# Patient Record
Sex: MALE | Race: WHITE
[De-identification: names, ages, dates, MRNs, and addresses within clinical notes are randomized per-mention and may not be internally consistent; named-entity substitution may affect disease eponyms.]

---

## 2018-02-17 ENCOUNTER — HOSPITAL ENCOUNTER (OUTPATIENT)
Dept: HOSPITAL 47 - RADMRIMAIN | Age: 58
Discharge: HOME | End: 2018-02-17
Attending: OTOLARYNGOLOGY
Payer: COMMERCIAL

## 2018-02-17 DIAGNOSIS — H90.42: Primary | ICD-10-CM

## 2018-02-17 PROCEDURE — 70553 MRI BRAIN STEM W/O & W/DYE: CPT

## 2018-02-17 NOTE — MR
EXAMINATION TYPE: MR brain and iac wo/w con

 

DATE OF EXAM: 2/17/2018

 

COMPARISON: NONE

 

HISTORY: 58-year-old male Sensorineural hearing loss, left ear, sinusitis, vertigo

 

TECHNIQUE:  Multiplanar, multisequence images of the brain and brainstem were acquired before and aft
er administration of 7.5 mL IV Gadavist.  Diffusion weighted imaging was performed.  Additional coned
-down sequences through the internal auditory canals and posterior cranial fossa before and after IV 
contrast administration.  

 

 

FINDINGS:  

Diffusion weighted images demonstrate no evidence of an acute ischemic lesion in the brain.

 

T2/FLAIR weighted sequences show no white matter signal abnormality.

 

Major intracranial flow voids are intact.

 

Midline structures demonstrate normal morphology.  The craniocervical junction is normal. 

 

Brain volume is maintained. No hydrocephalus.

 

There is no evidence of an acute intracranial hemorrhage, infarct, mass, mass-effect or an extra-axia
l fluid collection. 

 

There is no cerebellopontine angle mass. 

 

The internal auditory canals are symmetric.

 

Brainstem and skull base abnormalities are  not seen.

 

There appears to be opacification of the left mastoid air cells and left middle ear cavity with corre
sponding patchy enhancement. There may be adjacent dural enhancement along attic.

 

Post contrast images demonstrate no evidence of pathologic enhancement elsewhere in the posterior cra
nial fossa or the internal auditory canals. Dural venous sinuses are patent.

 

There is some left-sided nasal septal deviation. Moderate mucosal thickening within the ethmoid air c
ells and mild mucosal thickening throughout the remainder of the paranasal sinuses. Globes are intact
.

 

 

 

IMPRESSION:

1. Extensive opacification of the left mastoid air cells and left middle ear cavity with correspondin
g patchy enhancement. Correlate for otomastoiditis. Extensive cholesteatoma is considered less likely
.

2. Enhancement of the adjacent dura along the attic is probably reactive to the inflammation. A tempo
ral bone CT can further characterize any osseous changes such as erosions or dehiscence to the intrac
ranial space.

3. Otherwise, no acute intracranial abnormality seen.

4. Mild to moderate chronic paranasal sinus disease and leftward nasal septal deviation.

## 2022-04-11 ENCOUNTER — HOSPITAL ENCOUNTER (OUTPATIENT)
Dept: HOSPITAL 47 - RADCTMAIN | Age: 62
Discharge: HOME | End: 2022-04-11
Attending: STUDENT IN AN ORGANIZED HEALTH CARE EDUCATION/TRAINING PROGRAM
Payer: COMMERCIAL

## 2022-04-11 DIAGNOSIS — H93.8X2: Primary | ICD-10-CM

## 2022-04-11 PROCEDURE — 70480 CT ORBIT/EAR/FOSSA W/O DYE: CPT

## 2022-04-11 NOTE — CT
EXAMINATION TYPE: CT iac wo con

 

DATE OF EXAM: 4/11/2022

 

COMPARISON: MRI dated 2/17/2018

 

HISTORY: Lt ear pain

 

CT DLP: 229mGycm

Automated exposure control for dose reduction was used.

 

FINDINGS: 

Hypopneumatized mastoid air cells. Complete opacification of the left mastoid air cells with surround
ing sclerotic changes which could be related to chronic mastoiditis. This is associated with apparent
 chronic perforation of the left tympanic membrane and nonvisualized left middle ear ossicles, possib
ly chronically eroded. There is soft tissue thickening of the left middle ear cavity mainly the mesot
ympanum and hypotympanum with nonvisualization/erosion of the left scutum.

 

The overall picture could be due to chronic otomastoiditis however previous surgical intervention or 
underlying cholesteatoma cannot be excluded by this CT scan. Unremarkable right middle ear cavity. Sy
mmetrical unremarkable internal auditory canals and inner ear structures. Left tegmen tympani tiny anita
ne defect. Mucosal thickening of the ethmoid air cells and to a lesser extent the maxillary sinuses. 
Unremarkable visualized portion of the brain and orbits. 

 

IMPRESSION: 

Chronic changes involving the left middle ear cavity and left mastoid air cells as detailed above wit
h nonvisualization/chronic erosion of the left middle ear ossicles and left-sided scutum. This could 
be related to chronic otomastoiditis however underlying cholesteatoma or previous surgical interventi
on cannot be excluded, please correlate clinically. Further ENT consultation and further MRI with DWI
 assessment can be considered if not already performed. Other findings as described above.